# Patient Record
Sex: FEMALE | Race: OTHER | NOT HISPANIC OR LATINO | URBAN - METROPOLITAN AREA
[De-identification: names, ages, dates, MRNs, and addresses within clinical notes are randomized per-mention and may not be internally consistent; named-entity substitution may affect disease eponyms.]

---

## 2021-02-17 ENCOUNTER — EMERGENCY (EMERGENCY)
Facility: HOSPITAL | Age: 26
LOS: 1 days | Discharge: HOME | End: 2021-02-17
Attending: EMERGENCY MEDICINE | Admitting: EMERGENCY MEDICINE
Payer: SELF-PAY

## 2021-02-17 VITALS
TEMPERATURE: 98 F | RESPIRATION RATE: 17 BRPM | DIASTOLIC BLOOD PRESSURE: 88 MMHG | HEART RATE: 68 BPM | SYSTOLIC BLOOD PRESSURE: 123 MMHG | OXYGEN SATURATION: 98 %

## 2021-02-17 DIAGNOSIS — Z04.1 ENCOUNTER FOR EXAMINATION AND OBSERVATION FOLLOWING TRANSPORT ACCIDENT: ICD-10-CM
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
## 2021-02-17 PROCEDURE — 99282 EMERGENCY DEPT VISIT SF MDM: CPT

## 2021-02-17 PROCEDURE — 99283 EMERGENCY DEPT VISIT LOW MDM: CPT

## 2021-02-17 PROCEDURE — 99053 MED SERV 10PM-8AM 24 HR FAC: CPT
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
## 2021-02-17 NOTE — ED PROVIDER NOTE - PATIENT PORTAL LINK FT
You can access the FollowMyHealth Patient Portal offered by [facility] by registering at the following website: [email or web address]. By joining [facility]’s FollowMyHealth portal, you will also be able to view your health information using other applications (apps) compatible with our system.
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
## 2021-02-17 NOTE — ED ADULT NURSE NOTE - TEMPLATE LIST FOR HEAD TO TOE ASSESSMENT
I will not probably have time to call him today.   See if you can find out what this is about an pass it on to [facility]

## 2021-02-17 NOTE — ED ADULT NURSE NOTE - CHPI ED NUR SYMPTOMS NEG
no back pain/no bruising/no difficulty bearing weight/no disorientation/no dizziness/no fussiness/no headache/no loss of consciousness/no neck tenderness/no pain

## 2024-02-02 ENCOUNTER — INPATIENT (INPATIENT)
Facility: HOSPITAL | Age: 29
LOS: 0 days | Discharge: HOME | DRG: 439 | End: 2024-02-03
Attending: STUDENT IN AN ORGANIZED HEALTH CARE EDUCATION/TRAINING PROGRAM | Admitting: STUDENT IN AN ORGANIZED HEALTH CARE EDUCATION/TRAINING PROGRAM
Payer: COMMERCIAL

## 2024-02-02 VITALS
TEMPERATURE: 99 F | HEART RATE: 70 BPM | DIASTOLIC BLOOD PRESSURE: 80 MMHG | OXYGEN SATURATION: 98 % | RESPIRATION RATE: 16 BRPM | SYSTOLIC BLOOD PRESSURE: 122 MMHG

## 2024-02-02 DIAGNOSIS — K85.90 ACUTE PANCREATITIS WITHOUT NECROSIS OR INFECTION, UNSPECIFIED: ICD-10-CM

## 2024-02-02 DIAGNOSIS — R10.9 UNSPECIFIED ABDOMINAL PAIN: ICD-10-CM

## 2024-02-02 DIAGNOSIS — Z87.19 PERSONAL HISTORY OF OTHER DISEASES OF THE DIGESTIVE SYSTEM: ICD-10-CM

## 2024-02-02 DIAGNOSIS — Z29.9 ENCOUNTER FOR PROPHYLACTIC MEASURES, UNSPECIFIED: ICD-10-CM

## 2024-02-02 LAB
ALBUMIN SERPL ELPH-MCNC: 3.9 G/DL — SIGNIFICANT CHANGE UP (ref 3.4–5)
ALP SERPL-CCNC: 52 U/L — SIGNIFICANT CHANGE UP (ref 40–120)
ALT FLD-CCNC: 26 U/L — SIGNIFICANT CHANGE UP (ref 12–42)
ANION GAP SERPL CALC-SCNC: 8 MMOL/L — LOW (ref 9–16)
AST SERPL-CCNC: 16 U/L — SIGNIFICANT CHANGE UP (ref 15–37)
BASOPHILS # BLD AUTO: 0.02 K/UL — SIGNIFICANT CHANGE UP (ref 0–0.2)
BASOPHILS NFR BLD AUTO: 0.3 % — SIGNIFICANT CHANGE UP (ref 0–2)
BILIRUB DIRECT SERPL-MCNC: 0.1 MG/DL — SIGNIFICANT CHANGE UP (ref 0–0.3)
BILIRUB INDIRECT FLD-MCNC: 0.6 MG/DL — SIGNIFICANT CHANGE UP (ref 0.2–1)
BILIRUB SERPL-MCNC: 0.7 MG/DL — SIGNIFICANT CHANGE UP (ref 0.2–1.2)
BUN SERPL-MCNC: 9 MG/DL — SIGNIFICANT CHANGE UP (ref 7–23)
CALCIUM SERPL-MCNC: 8.8 MG/DL — SIGNIFICANT CHANGE UP (ref 8.5–10.5)
CHLORIDE SERPL-SCNC: 106 MMOL/L — SIGNIFICANT CHANGE UP (ref 96–108)
CO2 SERPL-SCNC: 27 MMOL/L — SIGNIFICANT CHANGE UP (ref 22–31)
CREAT SERPL-MCNC: 0.83 MG/DL — SIGNIFICANT CHANGE UP (ref 0.5–1.3)
EGFR: 98 ML/MIN/1.73M2 — SIGNIFICANT CHANGE UP
EOSINOPHIL # BLD AUTO: 0.06 K/UL — SIGNIFICANT CHANGE UP (ref 0–0.5)
EOSINOPHIL NFR BLD AUTO: 0.8 % — SIGNIFICANT CHANGE UP (ref 0–6)
GLUCOSE SERPL-MCNC: 126 MG/DL — HIGH (ref 70–99)
HCG SERPL-ACNC: <1 MIU/ML — SIGNIFICANT CHANGE UP
HCT VFR BLD CALC: 35.4 % — SIGNIFICANT CHANGE UP (ref 34.5–45)
HGB BLD-MCNC: 12.4 G/DL — SIGNIFICANT CHANGE UP (ref 11.5–15.5)
IMM GRANULOCYTES NFR BLD AUTO: 0.3 % — SIGNIFICANT CHANGE UP (ref 0–0.9)
LIDOCAIN IGE QN: >375 U/L — HIGH (ref 16–77)
LYMPHOCYTES # BLD AUTO: 1.71 K/UL — SIGNIFICANT CHANGE UP (ref 1–3.3)
LYMPHOCYTES # BLD AUTO: 21.6 % — SIGNIFICANT CHANGE UP (ref 13–44)
MCHC RBC-ENTMCNC: 30.5 PG — SIGNIFICANT CHANGE UP (ref 27–34)
MCHC RBC-ENTMCNC: 35 GM/DL — SIGNIFICANT CHANGE UP (ref 32–36)
MCV RBC AUTO: 87.2 FL — SIGNIFICANT CHANGE UP (ref 80–100)
MONOCYTES # BLD AUTO: 0.4 K/UL — SIGNIFICANT CHANGE UP (ref 0–0.9)
MONOCYTES NFR BLD AUTO: 5.1 % — SIGNIFICANT CHANGE UP (ref 2–14)
NEUTROPHILS # BLD AUTO: 5.69 K/UL — SIGNIFICANT CHANGE UP (ref 1.8–7.4)
NEUTROPHILS NFR BLD AUTO: 71.9 % — SIGNIFICANT CHANGE UP (ref 43–77)
NRBC # BLD: 0 /100 WBCS — SIGNIFICANT CHANGE UP (ref 0–0)
PLATELET # BLD AUTO: 211 K/UL — SIGNIFICANT CHANGE UP (ref 150–400)
POTASSIUM SERPL-MCNC: 3.5 MMOL/L — SIGNIFICANT CHANGE UP (ref 3.5–5.3)
POTASSIUM SERPL-SCNC: 3.5 MMOL/L — SIGNIFICANT CHANGE UP (ref 3.5–5.3)
PROT SERPL-MCNC: 7 G/DL — SIGNIFICANT CHANGE UP (ref 6.4–8.2)
RBC # BLD: 4.06 M/UL — SIGNIFICANT CHANGE UP (ref 3.8–5.2)
RBC # FLD: 11.7 % — SIGNIFICANT CHANGE UP (ref 10.3–14.5)
SODIUM SERPL-SCNC: 141 MMOL/L — SIGNIFICANT CHANGE UP (ref 132–145)
WBC # BLD: 7.9 K/UL — SIGNIFICANT CHANGE UP (ref 3.8–10.5)
WBC # FLD AUTO: 7.9 K/UL — SIGNIFICANT CHANGE UP (ref 3.8–10.5)

## 2024-02-02 PROCEDURE — 99285 EMERGENCY DEPT VISIT HI MDM: CPT

## 2024-02-02 PROCEDURE — 93010 ELECTROCARDIOGRAM REPORT: CPT

## 2024-02-02 PROCEDURE — 99223 1ST HOSP IP/OBS HIGH 75: CPT | Mod: GC

## 2024-02-02 RX ORDER — KETOROLAC TROMETHAMINE 30 MG/ML
15 SYRINGE (ML) INJECTION ONCE
Refills: 0 | Status: DISCONTINUED | OUTPATIENT
Start: 2024-02-02 | End: 2024-02-02

## 2024-02-02 RX ORDER — OXYCODONE HYDROCHLORIDE 5 MG/1
2.5 TABLET ORAL EVERY 6 HOURS
Refills: 0 | Status: DISCONTINUED | OUTPATIENT
Start: 2024-02-02 | End: 2024-02-03

## 2024-02-02 RX ORDER — INFLUENZA VIRUS VACCINE 15; 15; 15; 15 UG/.5ML; UG/.5ML; UG/.5ML; UG/.5ML
0.5 SUSPENSION INTRAMUSCULAR ONCE
Refills: 0 | Status: DISCONTINUED | OUTPATIENT
Start: 2024-02-02 | End: 2024-02-03

## 2024-02-02 RX ORDER — SODIUM CHLORIDE 9 MG/ML
1000 INJECTION, SOLUTION INTRAVENOUS ONCE
Refills: 0 | Status: COMPLETED | OUTPATIENT
Start: 2024-02-02 | End: 2024-02-02

## 2024-02-02 RX ORDER — ENOXAPARIN SODIUM 100 MG/ML
40 INJECTION SUBCUTANEOUS EVERY 24 HOURS
Refills: 0 | Status: DISCONTINUED | OUTPATIENT
Start: 2024-02-02 | End: 2024-02-03

## 2024-02-02 RX ORDER — SODIUM CHLORIDE 9 MG/ML
1000 INJECTION, SOLUTION INTRAVENOUS
Refills: 0 | Status: COMPLETED | OUTPATIENT
Start: 2024-02-02 | End: 2024-02-03

## 2024-02-02 RX ORDER — LANOLIN ALCOHOL/MO/W.PET/CERES
3 CREAM (GRAM) TOPICAL AT BEDTIME
Refills: 0 | Status: DISCONTINUED | OUTPATIENT
Start: 2024-02-02 | End: 2024-02-03

## 2024-02-02 RX ORDER — ONDANSETRON 8 MG/1
4 TABLET, FILM COATED ORAL ONCE
Refills: 0 | Status: COMPLETED | OUTPATIENT
Start: 2024-02-02 | End: 2024-02-02

## 2024-02-02 RX ORDER — MORPHINE SULFATE 50 MG/1
4 CAPSULE, EXTENDED RELEASE ORAL ONCE
Refills: 0 | Status: DISCONTINUED | OUTPATIENT
Start: 2024-02-02 | End: 2024-02-02

## 2024-02-02 RX ORDER — OXYCODONE HYDROCHLORIDE 5 MG/1
5 TABLET ORAL EVERY 6 HOURS
Refills: 0 | Status: DISCONTINUED | OUTPATIENT
Start: 2024-02-02 | End: 2024-02-03

## 2024-02-02 RX ORDER — ACETAMINOPHEN 500 MG
650 TABLET ORAL EVERY 6 HOURS
Refills: 0 | Status: DISCONTINUED | OUTPATIENT
Start: 2024-02-02 | End: 2024-02-03

## 2024-02-02 RX ORDER — OXYCODONE AND ACETAMINOPHEN 5; 325 MG/1; MG/1
1 TABLET ORAL ONCE
Refills: 0 | Status: DISCONTINUED | OUTPATIENT
Start: 2024-02-02 | End: 2024-02-02

## 2024-02-02 RX ORDER — ONDANSETRON 8 MG/1
4 TABLET, FILM COATED ORAL EVERY 8 HOURS
Refills: 0 | Status: DISCONTINUED | OUTPATIENT
Start: 2024-02-02 | End: 2024-02-03

## 2024-02-02 RX ORDER — SODIUM CHLORIDE 9 MG/ML
1000 INJECTION, SOLUTION INTRAVENOUS
Refills: 0 | Status: DISCONTINUED | OUTPATIENT
Start: 2024-02-02 | End: 2024-02-02

## 2024-02-02 RX ORDER — SODIUM CHLORIDE 9 MG/ML
1000 INJECTION INTRAMUSCULAR; INTRAVENOUS; SUBCUTANEOUS ONCE
Refills: 0 | Status: COMPLETED | OUTPATIENT
Start: 2024-02-02 | End: 2024-02-02

## 2024-02-02 RX ADMIN — MORPHINE SULFATE 4 MILLIGRAM(S): 50 CAPSULE, EXTENDED RELEASE ORAL at 08:34

## 2024-02-02 RX ADMIN — SODIUM CHLORIDE 1000 MILLILITER(S): 9 INJECTION INTRAMUSCULAR; INTRAVENOUS; SUBCUTANEOUS at 05:25

## 2024-02-02 RX ADMIN — SODIUM CHLORIDE 1000 MILLILITER(S): 9 INJECTION, SOLUTION INTRAVENOUS at 06:33

## 2024-02-02 RX ADMIN — SODIUM CHLORIDE 100 MILLILITER(S): 9 INJECTION, SOLUTION INTRAVENOUS at 23:50

## 2024-02-02 RX ADMIN — ENOXAPARIN SODIUM 40 MILLIGRAM(S): 100 INJECTION SUBCUTANEOUS at 15:52

## 2024-02-02 RX ADMIN — SODIUM CHLORIDE 100 MILLILITER(S): 9 INJECTION, SOLUTION INTRAVENOUS at 12:46

## 2024-02-02 NOTE — H&P ADULT - PROBLEM SELECTOR PLAN 3
Per patient, she has a history of having soft stools whenever she eats fatty food.   This may be due to an issue with the intestinal wall such as crohn's or celiac vs some form of exocrine insufficiency involving the pancreas vs infectious such as SIBO  - Follow up with GI outpatient

## 2024-02-02 NOTE — ED ADULT TRIAGE NOTE - CHIEF COMPLAINT QUOTE
Pt BIBA c/o abdominal discomfort w/ "GERD" and "slight diarrhea" x2 days. Pt endorses PMH of pancreatitis in 2019, states she was admitted to hospital and given IV antibiotics. Pt denies any other PMH.

## 2024-02-02 NOTE — H&P ADULT - NSHPLABSRESULTS_GEN_ALL_CORE
12.4   7.90  )-----------( 211      ( 02 Feb 2024 03:57 )             35.4       02-02    141  |  106  |  9   ----------------------------<  126<H>  3.5   |  27  |  0.83    Ca    8.8      02 Feb 2024 03:57    TPro  7.0  /  Alb  3.9  /  TBili  0.7  /  DBili  0.1  /  AST  16  /  ALT  26  /  AlkPhos  52  02-02              Urinalysis Basic - ( 02 Feb 2024 03:57 )    Color: x / Appearance: x / SG: x / pH: x  Gluc: 126 mg/dL / Ketone: x  / Bili: x / Urobili: x   Blood: x / Protein: x / Nitrite: x   Leuk Esterase: x / RBC: x / WBC x   Sq Epi: x / Non Sq Epi: x / Bacteria: x                  CAPILLARY BLOOD GLUCOSE

## 2024-02-02 NOTE — ED PROVIDER NOTE - OBJECTIVE STATEMENT
29 yo f pmhx sig for ideopathic pancreatitis pw 2 d of worsening epigastric abd pain radiating to the back mod in severity progreesivley increasing in severity with nausea and decreased PO intake.    I have reviewed available current nursing and previous documentation of past medical, surgical, family, and/or social history.

## 2024-02-02 NOTE — ED ADULT NURSE NOTE - OBJECTIVE STATEMENT
27 y/o female w/ hx of pancreatitis in 2019 here for eval of abdominal pain/discomfort w/ diarrhea. Patient is alert verbal oriented x3 able to make needs known

## 2024-02-02 NOTE — ED ADULT TRIAGE NOTE - TEMPERATURE IN FAHRENHEIT (DEGREES F)
Patient examined, no changes in medical condition found on re-examination since the pre op H & P was completed.    98.8

## 2024-02-02 NOTE — H&P ADULT - PROBLEM SELECTOR PLAN 4
F: mIVF  E: replete  DVT PPx: lovenox  GI PPx: pantoprazole  Code: FULL  Dispo: [facility]

## 2024-02-02 NOTE — H&P ADULT - HISTORY OF PRESENT ILLNESS
27 yo F with history of pancreatitis in 2019  cc:  abd pain, epigastric, radiating to the back for the past 2 days   N/V  diarrhea  inability to tolerate PO intake  GERD symptoms  alcohol?    Vitals stable. CBC normal. CMP normal. Lipase>375.  27 yo F with history of pancreatitis in 2019 presenting with 2 days of epigastric pain along with inability to tolerate PO intake and nausea since yesterday.     She denies any clear triggers prior to the onset of her abdominal pain. It is localized at the epigastric region and radiates to the back. Otherwise, she mentions not being able to tolerate food since yesterday. Whenever she would eat, she would feel like regurgitating the food. Nausea present but no vomiting. She denies any history of alcohol abuse, reports drinking socially 1-2 drinks per week.     Patient had an admission for pancreatitis in 2019 at [facility]. She mentions having workup done along with imaging and EUS, all of which came back unremarkable. Her father had pancreatitis once in the setting of alcohol abuse.     She mentions having some abdominal discomfort along with soft bowel movements whenever she eats fatty food.     Vitals stable. CBC normal. CMP normal. Lipase>375.

## 2024-02-02 NOTE — ED PROVIDER NOTE - PHYSICAL EXAMINATION
Physical Exam    Vital Signs: I have reviewed the initial vital signs.  Constitutional: well-appearing, appears stated age  Eyes: PERRLA, EOM intact, RAPD absent, and symmetrical lids.  ENT: Neck supple with no adenopathy, moist MM.  Cardiovascular: regular rate, regular rhythm, well-perfused extremities  Respiratory: unlabored respiratory effort, clear to auscultation bilaterally  Gastrointestinal: soft, non-tender abdomen, no pulsatile mass  Musculoskeletal: supple neck, no lower extremity edema  Integumentary: warm, dry, no rash  Neurologic: awake, alert, cranial nerves II-XII grossly intact, extremities’ motor and sensory functions grossly intact  Psychiatric: A&Ox3, appropriate mood, appropriate affect

## 2024-02-02 NOTE — ED PROVIDER NOTE - ATTENDING APP SHARED VISIT CONTRIBUTION OF CARE
Patient with epigastric pain, nausea, vomiting. No fever, ha, cp, sob, diarrhea, dysuria, urgency, frequency.    Gen: Well-developed, well-nourished, NAD, VS as noted by nursing. HEENT: NCAT, mmm   Chest: RRR, nl S1 and S2, no m/r/g. Resp: CTAB, no w/r/r  Abd: nl BS, soft, moderate epigastric tenderness. no g/r. Ext: Warm, dry  Neuro: CN II-XII intact, normal and equal strength, sensation, and reflexes bilaterally, normal gait  Psych: AAOx3     MDM: Patient positive for pancreatitis on labs, admitted to [facility]
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
## 2024-02-02 NOTE — ED PROVIDER NOTE - CLINICAL SUMMARY MEDICAL DECISION MAKING FREE TEXT BOX
well appear here with epigastric abd pain radiating to the back with decreased PO intake with epigastric ttp on exam, no guarding or rebound, suspected pancreatitis vs gerd vs PUD or gastritis, plan: cbc, cmp, lipase

## 2024-02-02 NOTE — H&P ADULT - PROBLEM SELECTOR PLAN 2
Given clinical symptoms and elevated lipase. History of idiopathic pancreatitis.   - Continue mIVF 100cc/h LR  - Pain regimen consistent of tylenol for mild, oxy 2.5 PO for mod q6h/prn and oxy 5 PO for severe q6h/prn  - Zofran for nausea, obtain EKG daily to monitor QTc  - started low fat, low residue diet, advance as tolerate

## 2024-02-02 NOTE — H&P ADULT - ATTENDING COMMENTS
29 yo F with history of pancreatitis in 2019 presenting with abd pain along with nausea and inability to tolerate PO intake, found to have acute pancreatitis    #Acute pancreatitis  - unclear etiology.  Was idiopathic in 2019 at [facility], no known gallstones.  Father had pancreatitis but in setting of EtOH.  Takes some chinese herbal supplements (Liver vitamin and another).    - f/u Triglycerides and RUQ to r/o gallstones  - f/u IgG 4, SAVANNAH for completeness  - c/w IVF  - does not want to try eating yet, advance diet as tolerated  - pain well controlled with tylenol alone

## 2024-02-02 NOTE — H&P ADULT - PROBLEM SELECTOR PLAN 1
2 days of epigastric abd pain that radiates to the back in a patient with Hx of pancreatitis.   Most likely acute pancreatitis given clinical symptoms and elevated lipase.   Possibly GERD vs PUD but no obvious acid reflux like symptoms.   - Plan as below

## 2024-02-02 NOTE — H&P ADULT - NSHPPHYSICALEXAM_GEN_ALL_CORE
VITAL SIGNS:  T(C): 36.8 (02-02-24 @ 09:51), Max: 37.1 (02-02-24 @ 03:49)  T(F): 98.3 (02-02-24 @ 09:51), Max: 98.8 (02-02-24 @ 03:49)  HR: 70 (02-02-24 @ 09:51) (70 - 81)  BP: 104/63 (02-02-24 @ 09:51) (103/64 - 122/80)  BP(mean): --  RR: 18 (02-02-24 @ 09:51) (16 - 18)  SpO2: 100% (02-02-24 @ 09:51) (97% - 100%)  Wt(kg): --    PHYSICAL EXAM:    Constitutional: NAD  Head: NC/AT  Eyes: PERRL, EOMI, anicteric sclera  ENT: no nasal discharge; uvula midline, no oropharyngeal erythema or exudates; MMM  Neck: supple; no JVD or thyromegaly  Respiratory: CTA B/L  Cardiac: +S1/S2; RRR  Gastrointestinal: abdomen soft, NT/ND; no rebound or guarding; +BSx4  Extremities: WWP, no clubbing or cyanosis; no peripheral edema  Vascular: 2+ radial, femoral, DP/PT pulses B/L  Dermatologic: skin warm, dry and intact; no rashes, wounds, or scars  Neurologic: AAOx3; CNII-XII grossly intact; no focal deficits  Psychiatric: affect and characteristics of appearance, verbalizations, behaviors are appropriate VITAL SIGNS:  T(C): 36.8 (02-02-24 @ 09:51), Max: 37.1 (02-02-24 @ 03:49)  T(F): 98.3 (02-02-24 @ 09:51), Max: 98.8 (02-02-24 @ 03:49)  HR: 70 (02-02-24 @ 09:51) (70 - 81)  BP: 104/63 (02-02-24 @ 09:51) (103/64 - 122/80)  BP(mean): --  RR: 18 (02-02-24 @ 09:51) (16 - 18)  SpO2: 100% (02-02-24 @ 09:51) (97% - 100%)  Wt(kg): --    PHYSICAL EXAM:    Constitutional: NAD  Head: NC/AT  Eyes: PERRL, EOMI, anicteric sclera  ENT: no nasal discharge; uvula midline, no oropharyngeal erythema or exudates; MMM  Neck: supple; no JVD or thyromegaly  Respiratory: CTA B/L  Cardiac: +S1/S2; RRR  Gastrointestinal: abdomen soft, mild tenderness in epigastric area; no rebound or guarding; +BSx4  Extremities: WWP, no clubbing or cyanosis; no peripheral edema  Vascular: 2+ radial and DP pulses B/L  Dermatologic: skin warm, dry and intact; no rashes, wounds, or scars  Neurologic: AAOx3; CNII-XII grossly intact; no focal deficits  Psychiatric: affect and characteristics of appearance, verbalizations, behaviors are appropriate

## 2024-02-03 VITALS
OXYGEN SATURATION: 96 % | RESPIRATION RATE: 18 BRPM | TEMPERATURE: 98 F | SYSTOLIC BLOOD PRESSURE: 119 MMHG | DIASTOLIC BLOOD PRESSURE: 70 MMHG | HEART RATE: 79 BPM

## 2024-02-03 LAB
ALBUMIN SERPL ELPH-MCNC: 3.6 G/DL — SIGNIFICANT CHANGE UP (ref 3.3–5)
ALP SERPL-CCNC: 55 U/L — SIGNIFICANT CHANGE UP (ref 40–120)
ALT FLD-CCNC: 16 U/L — SIGNIFICANT CHANGE UP (ref 10–45)
ANION GAP SERPL CALC-SCNC: 12 MMOL/L — SIGNIFICANT CHANGE UP (ref 5–17)
AST SERPL-CCNC: 16 U/L — SIGNIFICANT CHANGE UP (ref 10–40)
BASOPHILS # BLD AUTO: 0.02 K/UL — SIGNIFICANT CHANGE UP (ref 0–0.2)
BASOPHILS NFR BLD AUTO: 0.3 % — SIGNIFICANT CHANGE UP (ref 0–2)
BILIRUB SERPL-MCNC: 1 MG/DL — SIGNIFICANT CHANGE UP (ref 0.2–1.2)
BUN SERPL-MCNC: 8 MG/DL — SIGNIFICANT CHANGE UP (ref 7–23)
CALCIUM SERPL-MCNC: 8.4 MG/DL — SIGNIFICANT CHANGE UP (ref 8.4–10.5)
CHLORIDE SERPL-SCNC: 106 MMOL/L — SIGNIFICANT CHANGE UP (ref 96–108)
CO2 SERPL-SCNC: 18 MMOL/L — LOW (ref 22–31)
CREAT SERPL-MCNC: 0.65 MG/DL — SIGNIFICANT CHANGE UP (ref 0.5–1.3)
EGFR: 122 ML/MIN/1.73M2 — SIGNIFICANT CHANGE UP
EOSINOPHIL # BLD AUTO: 0.03 K/UL — SIGNIFICANT CHANGE UP (ref 0–0.5)
EOSINOPHIL NFR BLD AUTO: 0.5 % — SIGNIFICANT CHANGE UP (ref 0–6)
GLUCOSE SERPL-MCNC: 63 MG/DL — LOW (ref 70–99)
HCT VFR BLD CALC: 31.8 % — LOW (ref 34.5–45)
HGB BLD-MCNC: 11.4 G/DL — LOW (ref 11.5–15.5)
IMM GRANULOCYTES NFR BLD AUTO: 0.2 % — SIGNIFICANT CHANGE UP (ref 0–0.9)
LYMPHOCYTES # BLD AUTO: 1.28 K/UL — SIGNIFICANT CHANGE UP (ref 1–3.3)
LYMPHOCYTES # BLD AUTO: 21.6 % — SIGNIFICANT CHANGE UP (ref 13–44)
MAGNESIUM SERPL-MCNC: 1.9 MG/DL — SIGNIFICANT CHANGE UP (ref 1.6–2.6)
MCHC RBC-ENTMCNC: 30.6 PG — SIGNIFICANT CHANGE UP (ref 27–34)
MCHC RBC-ENTMCNC: 35.8 GM/DL — SIGNIFICANT CHANGE UP (ref 32–36)
MCV RBC AUTO: 85.3 FL — SIGNIFICANT CHANGE UP (ref 80–100)
MONOCYTES # BLD AUTO: 0.28 K/UL — SIGNIFICANT CHANGE UP (ref 0–0.9)
MONOCYTES NFR BLD AUTO: 4.7 % — SIGNIFICANT CHANGE UP (ref 2–14)
NEUTROPHILS # BLD AUTO: 4.31 K/UL — SIGNIFICANT CHANGE UP (ref 1.8–7.4)
NEUTROPHILS NFR BLD AUTO: 72.7 % — SIGNIFICANT CHANGE UP (ref 43–77)
NRBC # BLD: 0 /100 WBCS — SIGNIFICANT CHANGE UP (ref 0–0)
PHOSPHATE SERPL-MCNC: 3.3 MG/DL — SIGNIFICANT CHANGE UP (ref 2.5–4.5)
PLATELET # BLD AUTO: 170 K/UL — SIGNIFICANT CHANGE UP (ref 150–400)
POTASSIUM SERPL-MCNC: 3.5 MMOL/L — SIGNIFICANT CHANGE UP (ref 3.5–5.3)
POTASSIUM SERPL-SCNC: 3.5 MMOL/L — SIGNIFICANT CHANGE UP (ref 3.5–5.3)
PROT SERPL-MCNC: 5.6 G/DL — LOW (ref 6–8.3)
RBC # BLD: 3.73 M/UL — LOW (ref 3.8–5.2)
RBC # FLD: 11.8 % — SIGNIFICANT CHANGE UP (ref 10.3–14.5)
SODIUM SERPL-SCNC: 136 MMOL/L — SIGNIFICANT CHANGE UP (ref 135–145)
TRIGL SERPL-MCNC: 47 MG/DL — SIGNIFICANT CHANGE UP
WBC # BLD: 5.93 K/UL — SIGNIFICANT CHANGE UP (ref 3.8–10.5)
WBC # FLD AUTO: 5.93 K/UL — SIGNIFICANT CHANGE UP (ref 3.8–10.5)

## 2024-02-03 PROCEDURE — 76705 ECHO EXAM OF ABDOMEN: CPT | Mod: 26

## 2024-02-03 PROCEDURE — 84702 CHORIONIC GONADOTROPIN TEST: CPT

## 2024-02-03 PROCEDURE — 96374 THER/PROPH/DIAG INJ IV PUSH: CPT

## 2024-02-03 PROCEDURE — 80048 BASIC METABOLIC PNL TOTAL CA: CPT

## 2024-02-03 PROCEDURE — 84100 ASSAY OF PHOSPHORUS: CPT

## 2024-02-03 PROCEDURE — 83735 ASSAY OF MAGNESIUM: CPT

## 2024-02-03 PROCEDURE — 99239 HOSP IP/OBS DSCHRG MGMT >30: CPT

## 2024-02-03 PROCEDURE — 85025 COMPLETE CBC W/AUTO DIFF WBC: CPT

## 2024-02-03 PROCEDURE — 80076 HEPATIC FUNCTION PANEL: CPT

## 2024-02-03 PROCEDURE — 99285 EMERGENCY DEPT VISIT HI MDM: CPT

## 2024-02-03 PROCEDURE — 36415 COLL VENOUS BLD VENIPUNCTURE: CPT

## 2024-02-03 PROCEDURE — 86038 ANTINUCLEAR ANTIBODIES: CPT

## 2024-02-03 PROCEDURE — 76705 ECHO EXAM OF ABDOMEN: CPT

## 2024-02-03 PROCEDURE — 93005 ELECTROCARDIOGRAM TRACING: CPT

## 2024-02-03 PROCEDURE — 80053 COMPREHEN METABOLIC PANEL: CPT

## 2024-02-03 PROCEDURE — 83690 ASSAY OF LIPASE: CPT

## 2024-02-03 PROCEDURE — 84478 ASSAY OF TRIGLYCERIDES: CPT

## 2024-02-03 PROCEDURE — 82787 IGG 1 2 3 OR 4 EACH: CPT

## 2024-02-03 RX ORDER — LIPASE/PROTEASE/AMYLASE 16-48-48K
1 CAPSULE,DELAYED RELEASE (ENTERIC COATED) ORAL
Qty: 21 | Refills: 0
Start: 2024-02-03 | End: 2024-02-09

## 2024-02-03 RX ORDER — POTASSIUM CHLORIDE 20 MEQ
40 PACKET (EA) ORAL ONCE
Refills: 0 | Status: DISCONTINUED | OUTPATIENT
Start: 2024-02-03 | End: 2024-02-03

## 2024-02-03 RX ORDER — POTASSIUM CHLORIDE 20 MEQ
40 PACKET (EA) ORAL ONCE
Refills: 0 | Status: COMPLETED | OUTPATIENT
Start: 2024-02-03 | End: 2024-02-03

## 2024-02-03 RX ADMIN — SODIUM CHLORIDE 100 MILLILITER(S): 9 INJECTION, SOLUTION INTRAVENOUS at 14:50

## 2024-02-03 RX ADMIN — Medication 40 MILLIEQUIVALENT(S): at 08:52

## 2024-02-03 NOTE — DISCHARGE NOTE PROVIDER - NSDCCPCAREPLAN_GEN_ALL_CORE_FT
PRINCIPAL DISCHARGE DIAGNOSIS  Diagnosis: Acute pancreatitis  Assessment and Plan of Treatment: You were found to have acute pancreatitis. This is an inflammatory state involving the pancrease. There are numerous triggers fr this but the most common are gallstones and heavy alcohol use. You abdominal ultrasound demonstrated ______. We treated you symptomatically with IV fluids, antinausea, and pain medications. Because your abdominal pain, nausea/vomiting improved, you are ready to return home and continue recovery. We recommend that you abstain from fatty foods, alcohol, and tobacco smoking over the next 2-4 days. Additionally, you may find it easier on your digestive system to eat 4-6 small meals a day instead of 3 large meals during this recovery phase. If you feel that your nausea/vomiting or abdominal pain worsens after you leave, please call your doctor or come back to the ER for immediate help.     PRINCIPAL DISCHARGE DIAGNOSIS  Diagnosis: Acute pancreatitis  Assessment and Plan of Treatment: You were found to have acute pancreatitis. This is an inflammatory state involving the pancrease. There are numerous triggers fr this but the most common are gallstones and heavy alcohol use. You abdominal ultrasound normal appearing bile duct and gallbladder without any gallstones. We treated you symptomatically with IV fluids, antinausea, and pain medications. Because your abdominal pain, nausea/vomiting improved, you are ready to return home and continue recovery. We recommend that you abstain from fatty foods, alcohol, and tobacco smoking over the next 2-4 days. Additionally, you may find it easier on your digestive system to eat 4-6 small meals a day instead of 3 large meals during this recovery phase. If you feel that your nausea/vomiting or abdominal pain worsens after you leave, please call your doctor or come back to the ER for immediate help. Additionally, please follow up with your PCP after discharge.

## 2024-02-03 NOTE — DISCHARGE NOTE PROVIDER - NSDCCPTREATMENT_GEN_ALL_CORE_FT
PRINCIPAL PROCEDURE  Procedure: Right upper quadrant ultrasound  Findings and Treatment: 2/3/24  FINDINGS:  Liver: Within normal limits.  Bile ducts: Normal caliber. Common bile duct measures 4 mm.  Gallbladder: Within normal limits.  Pancreas: Visualized portions are within normal limits.  Right kidney: 10.8 cm. No hydronephrosis.  Ascites: None.  IVC: Visualized portions are within normal limits.  IMPRESSION:  Normal examination.

## 2024-02-03 NOTE — DISCHARGE NOTE PROVIDER - CARE PROVIDERS DIRECT ADDRESSES
[email or web address]

## 2024-02-03 NOTE — PROGRESS NOTE ADULT - PROBLEM SELECTOR PLAN 4
F: mIVF  E: replete  DVT PPx: lovenox  GI PPx: pantoprazole  Code: FULL  Dispo: [facility]

## 2024-02-03 NOTE — PROGRESS NOTE ADULT - SUBJECTIVE AND OBJECTIVE BOX
INTERVAL HPI/OVERNIGHT EVENTS:  O/N:  This morning: Patient was seen and examined at bedside.      VITAL SIGNS:  T(F): 97.8 (02-03-24 @ 05:48)  HR: 81 (02-03-24 @ 05:48)  BP: 114/66 (02-03-24 @ 05:48)  RR: 17 (02-03-24 @ 05:48)  SpO2: 98% (02-03-24 @ 05:48)  Wt(kg): --    I/O:      PHYSICAL EXAM:    Constitutional: NAD  HEENT: PERRL, EOMI, sclera non-icteric, neck supple, trachea midline, no masses, no JVD, MMM, good dentition  Respiratory: CTA b/l, good air entry b/l, no wheezing, no rhonchi, no rales, without accessory muscle use and no intercostal retractions  Cardiovascular: RRR, normal S1S2, no M/R/G  Gastrointestinal: abdomen soft, NTND, no masses palpable, BS normal  Extremities: Warm, well perfused, pulses equal bilateral upper and lower extremities, no edema, no clubbing  Neurological: AAOx3, CN Grossly intact  Skin: Normal temperature, warm, dry    MEDICATIONS  (STANDING):  enoxaparin Injectable 40 milliGRAM(s) SubCutaneous every 24 hours  influenza   Vaccine 0.5 milliLiter(s) IntraMuscular once  lactated ringers. 1000 milliLiter(s) (100 mL/Hr) IV Continuous <Continuous>  potassium chloride   Powder 40 milliEquivalent(s) Oral once    MEDICATIONS  (PRN):  acetaminophen     Tablet .. 650 milliGRAM(s) Oral every 6 hours PRN Temp greater or equal to 38C (100.4F), Mild Pain (1 - 3)  aluminum hydroxide/magnesium hydroxide/simethicone Suspension 30 milliLiter(s) Oral every 4 hours PRN Dyspepsia  melatonin 3 milliGRAM(s) Oral at bedtime PRN Insomnia  ondansetron Injectable 4 milliGRAM(s) IV Push every 8 hours PRN Nausea and/or Vomiting  oxyCODONE    IR 5 milliGRAM(s) Oral every 6 hours PRN Severe Pain (7 - 10)  oxyCODONE    IR 2.5 milliGRAM(s) Oral every 6 hours PRN Moderate Pain (4 - 6)      Allergies    No Known Allergies    Intolerances        LABS:                        12.4   7.90  )-----------( 211      ( 02 Feb 2024 03:57 )             35.4     02-03    136  |  106  |  8   ----------------------------<  63<L>  3.5   |  x   |  0.65    Ca    8.4      03 Feb 2024 07:49  Phos  3.3     02-03  Mg     1.9     02-03    TPro  7.0  /  Alb  3.9  /  TBili  0.7  /  DBili  0.1  /  AST  16  /  ALT  26  /  AlkPhos  52  02-02      Urinalysis Basic - ( 03 Feb 2024 07:49 )    Color: x / Appearance: x / SG: x / pH: x  Gluc: 63 mg/dL / Ketone: x  / Bili: x / Urobili: x   Blood: x / Protein: x / Nitrite: x   Leuk Esterase: x / RBC: x / WBC x   Sq Epi: x / Non Sq Epi: x / Bacteria: x                RADIOLOGY & ADDITIONAL TESTS:  Reviewed INTERVAL HPI/OVERNIGHT EVENTS: BANDAR    This morning: Patient was seen and examined at bedside. The patient was encountered lying in bed, AAOx3, in no acute distress. She endorsed feeling better, but c/o mild tension headache. Tylenol administered with improvement. The pt denied chills, sweats, SOB, and chest pain. She also denied n/v/d.     VITAL SIGNS:  T(F): 97.8 (02-03-24 @ 05:48)  HR: 81 (02-03-24 @ 05:48)  BP: 114/66 (02-03-24 @ 05:48)  RR: 17 (02-03-24 @ 05:48)  SpO2: 98% (02-03-24 @ 05:48)  Wt(kg): --    I/O:      PHYSICAL EXAM:    Constitutional: NAD  HEENT: PERRL, EOMI, sclera non-icteric, neck supple, trachea midline, no masses, no JVD, MMM, good dentition  Respiratory: CTA b/l, good air entry b/l, no wheezing, no rhonchi, no rales, without accessory muscle use and no intercostal retractions  Cardiovascular: RRR, normal S1S2, no M/R/G  Gastrointestinal: abdomen soft, NTND, no masses palpable, BS normal  Extremities: Warm, well perfused, pulses equal bilateral upper and lower extremities, no edema, no clubbing  Neurological: AAOx3, CN Grossly intact  Skin: Normal temperature, warm, dry    MEDICATIONS  (STANDING):  enoxaparin Injectable 40 milliGRAM(s) SubCutaneous every 24 hours  influenza   Vaccine 0.5 milliLiter(s) IntraMuscular once  lactated ringers. 1000 milliLiter(s) (100 mL/Hr) IV Continuous <Continuous>  potassium chloride   Powder 40 milliEquivalent(s) Oral once    MEDICATIONS  (PRN):  acetaminophen     Tablet .. 650 milliGRAM(s) Oral every 6 hours PRN Temp greater or equal to 38C (100.4F), Mild Pain (1 - 3)  aluminum hydroxide/magnesium hydroxide/simethicone Suspension 30 milliLiter(s) Oral every 4 hours PRN Dyspepsia  melatonin 3 milliGRAM(s) Oral at bedtime PRN Insomnia  ondansetron Injectable 4 milliGRAM(s) IV Push every 8 hours PRN Nausea and/or Vomiting  oxyCODONE    IR 5 milliGRAM(s) Oral every 6 hours PRN Severe Pain (7 - 10)  oxyCODONE    IR 2.5 milliGRAM(s) Oral every 6 hours PRN Moderate Pain (4 - 6)      Allergies    No Known Allergies    Intolerances        LABS:                        12.4   7.90  )-----------( 211      ( 02 Feb 2024 03:57 )             35.4     02-03    136  |  106  |  8   ----------------------------<  63<L>  3.5   |  x   |  0.65    Ca    8.4      03 Feb 2024 07:49  Phos  3.3     02-03  Mg     1.9     02-03    TPro  7.0  /  Alb  3.9  /  TBili  0.7  /  DBili  0.1  /  AST  16  /  ALT  26  /  AlkPhos  52  02-02      Urinalysis Basic - ( 03 Feb 2024 07:49 )    Color: x / Appearance: x / SG: x / pH: x  Gluc: 63 mg/dL / Ketone: x  / Bili: x / Urobili: x   Blood: x / Protein: x / Nitrite: x   Leuk Esterase: x / RBC: x / WBC x   Sq Epi: x / Non Sq Epi: x / Bacteria: x                RADIOLOGY & ADDITIONAL TESTS:  Reviewed

## 2024-02-03 NOTE — DISCHARGE NOTE NURSING/CASE MANAGEMENT/SOCIAL WORK - PATIENT PORTAL LINK FT
You can access the FollowMyHealth Patient Portal offered by [facility] by registering at the following website: [email or web address]. By joining [employer]’s FollowMyHealth portal, you will also be able to view your health information using other applications (apps) compatible with our system.

## 2024-02-03 NOTE — DISCHARGE NOTE PROVIDER - PROVIDER TOKENS
PROVIDER:[TOKEN:[[id number]],FOLLOWUP:[2 weeks]] PROVIDER:[TOKEN:[[id number]],FOLLOWUP:[2 weeks]],PROVIDER:[TOKEN:[[id number]],FOLLOWUP:[1 week],ESTABLISHEDPATIENT:[T]]

## 2024-02-03 NOTE — DISCHARGE NOTE PROVIDER - CARE PROVIDER_API CALL
Hali Sullivan)  Internal Medicine  [address]  [place] [address]  Phone: [phone]  Fax: [phone]  Follow Up Time: 2 weeks   Hali Sullivan)  Internal Medicine  [address]  [place] [address]  Phone: [phone]  Fax: [phone]  Follow Up Time: 2 weeks    Regina Camp  Gastroenterology  [address] [facility]  [place] [address]  Phone: [phone]  Fax: [phone]  Established Patient  Follow Up Time: 1 week

## 2024-02-03 NOTE — PROGRESS NOTE ADULT - ASSESSMENT
29 yo F with history of pancreatitis in 2019 presenting with abd pain along with nausea and inability to tolerate PO intake, found with elevated lipase, being admitted for acute pancreatitis

## 2024-02-03 NOTE — DISCHARGE NOTE NURSING/CASE MANAGEMENT/SOCIAL WORK - NSDCPEFALRISK_GEN_ALL_CORE
For information on Fall & Injury Prevention, visit: [email or web address] OR  [email or web address] OR  https://www.cdc.gov/steadi/patient.html

## 2024-02-03 NOTE — DISCHARGE NOTE PROVIDER - HOSPITAL COURSE
27 yo F with history of pancreatitis in 2019 presenting with abd pain along with nausea and inability to tolerate PO intake. CBC normal. CMP normal. Lipase>375. Found to have acute pancreatitis. Treated with IV fluids and bowel rest overnight. Repleted potassium. Tolerated PO intake next morning. RUQ demonstrated ____. Medically ready for discharge.     Problem List/Main Diagnoses:     Problem/Plan - 1:  ·  Problem: Abdominal pain.   ·  Plan: 2 days of epigastric abd pain that radiates to the back in a patient with Hx of pancreatitis.   Most likely acute pancreatitis given clinical symptoms and elevated lipase.   - Plan as below.     Problem/Plan - 2:  ·  Problem: Acute pancreatitis.   ·  Plan: Given clinical symptoms and elevated lipase. History of idiopathic pancreatitis.   - s/p mIVF 100cc/h LR  - Pain regimen consistent of tylenol for mild, oxy 2.5 PO for mod q6h/prn and oxy 5 PO for severe q6h/prn  - minimal use of Zofran for nausea, EKG unremarkable   - tolerated low fat, low residue diet, advance as tolerate.     Problem/Plan - 3:  ·  Problem: H/O intestinal malabsorption.   ·  Plan: Per patient, she has a history of having soft stools whenever she eats fatty food.   This may be due to an issue with the intestinal wall such as crohn's or celiac vs some form of exocrine insufficiency involving the pancreas vs infectious such as SIBO  - Follow up with GI outpatient.      Patient was discharged to: (home/[facility]/acute rehab/hospice, etc, and with what services – home health PT/RN? Home O2?)  New medications: none   Changes to old medications: none   Medications that were stopped: none   Items to follow up as outpatient: PCP     Physical exam at the time of discharge:  Constitutional: NAD  Head: NC/AT  Eyes: PERRL, EOMI, anicteric sclera  ENT: no nasal discharge; uvula midline, no oropharyngeal erythema or exudates; MMM  Neck: supple; no JVD or thyromegaly  Respiratory: CTA B/L  Cardiac: +S1/S2; RRR  Gastrointestinal: abdomen soft, mild tenderness in epigastric area; no rebound or guarding; +BSx4  Extremities: WWP, no clubbing or cyanosis; no peripheral edema  Vascular: 2+ radial and DP pulses B/L  Dermatologic: skin warm, dry and intact; no rashes, wounds, or scars  Neurologic: AAOx3; CNII-XII grossly intact; no focal deficits  Psychiatric: affect and characteristics of appearance, verbalizations, behaviors are appropriate 27 yo F with history of pancreatitis in 2019 presenting with abd pain along with nausea and inability to tolerate PO intake. CBC normal. CMP normal. Lipase>375. Found to have acute pancreatitis. Treated with IV fluids and bowel rest overnight. Repleted potassium. Tolerated PO intake next morning. RUQ demonstrated ____. Medically ready for discharge.     Problem List/Main Diagnoses:     #Abdominal pain.   ·  Plan: 2 days of epigastric abd pain that radiates to the back in a patient with Hx of pancreatitis.   Most likely acute pancreatitis given clinical symptoms and elevated lipase.   - Plan as below.    #Acute pancreatitis.   ·  Plan: Given clinical symptoms and elevated lipase. History of idiopathic pancreatitis.   - s/p mIVF 100cc/h LR  - Pain regimen consistent of tylenol for mild, oxy 2.5 PO for mod q6h/prn and oxy 5 PO for severe q6h/prn  - minimal use of Zofran for nausea, EKG unremarkable   - tolerated low fat, low residue diet, advance as tolerate.    #H/O intestinal malabsorption.   ·  Plan: Per patient, she has a history of having soft stools whenever she eats fatty food.   This may be due to an issue with the intestinal wall such as crohn's or celiac vs some form of exocrine insufficiency involving the pancreas vs infectious such as SIBO  - Follow up with GI outpatient.      Patient was discharged to: Home    New medications: none   Changes to old medications: none   Medications that were stopped: none   Items to follow up as outpatient: PCP     Physical exam at the time of discharge:  Constitutional: NAD  Head: NC/AT  Eyes: PERRL, EOMI, anicteric sclera  ENT: no nasal discharge; uvula midline, no oropharyngeal erythema or exudates; MMM  Neck: supple; no JVD or thyromegaly  Respiratory: CTA B/L  Cardiac: +S1/S2; RRR  Gastrointestinal: abdomen soft, mild tenderness in epigastric area; no rebound or guarding; +BSx4  Extremities: WWP, no clubbing or cyanosis; no peripheral edema  Vascular: 2+ radial and DP pulses B/L  Dermatologic: skin warm, dry and intact; no rashes, wounds, or scars  Neurologic: AAOx3; CNII-XII grossly intact; no focal deficits  Psychiatric: affect and characteristics of appearance, verbalizations, behaviors are appropriate 29 yo F with history of pancreatitis in 2019 presenting with abd pain along with nausea and inability to tolerate PO intake. CBC normal. CMP normal. Lipase>375. Found to have acute pancreatitis. Treated with IV fluids and bowel rest overnight. Repleted potassium. Tolerated PO intake next morning. RUQ demonstrated no gallstones and non distended bile ducts. Medically ready for discharge.     Problem List/Main Diagnoses:     #Abdominal pain.   ·  Plan: 2 days of epigastric abd pain that radiates to the back in a patient with Hx of pancreatitis.   Most likely acute pancreatitis given clinical symptoms and elevated lipase.   - Plan as below.    #Acute pancreatitis.   ·  Plan: Given clinical symptoms and elevated lipase. History of idiopathic pancreatitis.   - s/p mIVF 100cc/h LR  - s/p pain regimen consistent of tylenol for mild, oxy 2.5 PO for mod q6h/prn and oxy 5 PO for severe q6h/prn  - minimal use of Zofran for nausea, EKG unremarkable   - tolerated low fat, low residue diet, advance as tolerate.    #H/O intestinal malabsorption.   ·  Plan: Per patient, she has a history of having soft stools whenever she eats fatty food.   This may be due to an issue with the intestinal wall such as crohn's or celiac vs some form of exocrine insufficiency involving the pancreas vs infectious such as SIBO  - Follow up with GI outpatient.      Patient was discharged to: Home    New medications: none   Changes to old medications: none   Medications that were stopped: none   Items to follow up as outpatient: PCP     Physical exam at the time of discharge:  Constitutional: NAD  Head: NC/AT  Eyes: PERRL, EOMI, anicteric sclera  ENT: no nasal discharge; uvula midline, no oropharyngeal erythema or exudates; MMM  Neck: supple; no JVD or thyromegaly  Respiratory: CTA B/L  Cardiac: +S1/S2; RRR  Gastrointestinal: abdomen soft, mild tenderness in epigastric area; no rebound or guarding; +BSx4  Extremities: WWP, no clubbing or cyanosis; no peripheral edema  Vascular: 2+ radial and DP pulses B/L  Dermatologic: skin warm, dry and intact; no rashes, wounds, or scars  Neurologic: AAOx3; CNII-XII grossly intact; no focal deficits  Psychiatric: affect and characteristics of appearance, verbalizations, behaviors are appropriate

## 2024-02-05 LAB — IGG4 SER-MCNC: 5.8 MG/DL — SIGNIFICANT CHANGE UP (ref 1–123)

## 2024-02-06 LAB — ANA TITR SER: NEGATIVE — SIGNIFICANT CHANGE UP

## 2024-02-08 DIAGNOSIS — K85.00 IDIOPATHIC ACUTE PANCREATITIS WITHOUT NECROSIS OR INFECTION: ICD-10-CM

## 2024-02-08 DIAGNOSIS — K90.9 INTESTINAL MALABSORPTION, UNSPECIFIED: ICD-10-CM

## 2024-02-09 ENCOUNTER — NON-APPOINTMENT (OUTPATIENT)
Age: 29
End: 2024-02-09

## 2024-02-09 PROBLEM — Z00.00 ENCOUNTER FOR PREVENTIVE HEALTH EXAMINATION: Status: ACTIVE | Noted: 2024-02-09
